# Patient Record
Sex: FEMALE | Race: ASIAN
[De-identification: names, ages, dates, MRNs, and addresses within clinical notes are randomized per-mention and may not be internally consistent; named-entity substitution may affect disease eponyms.]

---

## 2017-02-20 ENCOUNTER — HOSPITAL ENCOUNTER (EMERGENCY)
Dept: HOSPITAL 53 - M ED | Age: 27
Discharge: HOME | End: 2017-02-20
Payer: OTHER GOVERNMENT

## 2017-02-20 DIAGNOSIS — J10.1: Primary | ICD-10-CM

## 2017-02-20 DIAGNOSIS — J02.9: ICD-10-CM

## 2017-02-20 DIAGNOSIS — R11.2: ICD-10-CM

## 2017-02-20 DIAGNOSIS — Z87.891: ICD-10-CM

## 2017-02-20 NOTE — EDDOCDS
Physician Documentation                                                                           

[facility]                                                                         

Name: Aggie Paredes                                                                                

Age: 26 yrs                                                                                       

Sex: Female                                                                                       

: 1990                                                                                   

MRN: [id number]                                                                                     

Arrival Date: 2017                                                                          

Time: 13:23                                                                                       

Account#: [id number]                                                                              

Bed I1 / M1                                                                                       

Private MD:                                                                                       

Disposition:                                                                                      

17 14:30 Discharged to Home/Self Care. Impression: Influenza due to identified novel        

influenza A virus, Nausea and vomiting.                                                         

- Condition is Stable.                                                                            

- Discharge Instructions: Influenza Adult, Nausea and Vomiting, Easy-to-Read.                     

- Prescriptions for Ibuprofen 800 mg Oral Tablet - take 1 tablet by ORAL route every 8            

hours As needed take with food; 30 tablet. Tamiflu 75 mg Oral Capsule - take 1                  

capsule by ORAL route every 12 hours for 5 days; 10 capsule. Zofran 4 mg Oral Tablet            

- take 1 tablet by ORAL route 4 times per day As needed; 10 tablet.                             

- Medication Reconciliation, Local Pharmacy Hours, Work Release Form - 3 day form.                

- Follow up: Private Physician; When: 1 - 2 days; Reason: Recheck today's complaints,             

Continuance of care. Follow up: Emergency Department; Reason: Worsening of conditions.          

- Problem is new.                                                                                 

- Symptoms have improved.                                                                         

                                                                                                  

                                                                                                  

                                                                                                  

Historical:                                                                                       

- Allergies: no known allergies;                                                                  

- Home Meds:                                                                                      

1. none                                                                                         

- PMHx: none;                                                                                     

- PSHx: none;                                                                                     

- Social history: Smoking status: Patient states former smoker of tobacco. No barriers            

to communication noted, The patient speaks fluent English, Speaks appropriately for             

age.                                                                                            

- Family history: Not pertinent.                                                                  

- : The pt / caregiver states he / she is not on anticoagulants. Home medication list             

is obtained from the patient.                                                                   

- Exposure Risk Screening:: None identified.                                                      

                                                                                                  

                                                                                                  

OB/GYN:                                                                                           

                                                                                             

13:32 LMP 2017                                                                            pml 

                                                                                                  

Vital Signs:                                                                                      

13:27  / 76 RA Sitting (auto/reg); Pulse 103; Resp 20; Temp 101.6(O); Pulse Ox 99% on   jrd 

      R/A; Weight 72.85 kg / 160.61 lbs (M); Height 5 ft. 6 in. (167.64 cm); Pain 6/10;           

14:25  / 64; Pulse 93; Resp 16; Temp 100.8(O); Pulse Ox 96% ;                           lr2 

13:27 Body Mass Index 25.92 (72.85 kg, 167.64 cm)                                             jrd 

                                                                                                  

MDM:                                                                                              

13:36 Acetaminophen Tablet 650 mg PO once ordered.                                            ml6 

13:36 Obtain sample by nasopharyngeal swab ordered.                                           ml6 

13:36 Strep Screen, Nursing ordered.                                                          ml6 

13:36 -Influenza A&B Rapid Antigen - Nose Ordered.                                            EDMS

13:45 GATS (NEGATIVE STREP SCREEN) Ordered.                                                   EDMS

14:07 -Influenza A&B Rapid Antigen - Nose Reviewed.                                           sd1

14:11 Oseltamivir 75 mg PO once ordered.                                                      ef1 

14:30 Ondansetron ODT Oral Disintegrating Tablet 4 mg PO once ordered.                        ef1 

14:50 Financial registration complete.                                                        mm15

14:50 [facility] Payment Agreement was scanned into [employer] and attached to record.               mm15

                                                                                                  

Administered Medications:                                                                         

13:38 Drug: Acetaminophen 650 mg [acetaminophen 325 mg tablet (2 tabs)] Route: PO;            pml 

14:34 Drug: Oseltamivir 75 mg [oseltamivir 75 mg capsule (1 caps)] Route: PO;                 kc3 

14:48 Drug: Ondansetron ODT 4 mg [ondansetron 4 mg disintegrating tablet (1 tabs)] Route: PO; kc3 

                                                                                                  

                                                                                                  

Signatures:                                                                                       

Dispatcher [employer]                           EDMS                                                 

Gabi Bales MD MD   sd1                                                  

Marly Latif, PA-C                      PA-C ef1                                                  

Matty Fulton, FLORY                       RN   ml6                                                  

Cheryl Britt RN RN   pml                                                  

Larno Medina                             mm15                                                 

Caitlin Thorpe RN RN   kc3                                                  

                                                                                                  

The chart was reviewed and I authenticate all verbal orders and agree with the evaluation and 
treatment provided.Attachments:

14:50 [facility] Payment Agreement                                                                mm15

                                                                                                  

**************************************************************************************************

MTDD

## 2017-02-20 NOTE — EDDOCDS
Nurse's Notes                                                                                     

[facility]                                                                         

Name: Aggie Paredes                                                                                

Age: 26 yrs                                                                                       

Sex: Female                                                                                       

: 1990                                                                                   

MRN: [id number]                                                                                     

Arrival Date: 2017                                                                          

Time: 13:23                                                                                       

Account#: [id number]                                                                              

Bed I1 / M1                                                                                       

Private MD:                                                                                       

Diagnosis: Influenza due to identified novel influenza A virus;Nausea and vomiting                

                                                                                                  

Presentation:                                                                                     

                                                                                             

13:31 Presenting complaint: Patient states: sore throat and feels like she has a fever, sinus pml 

      congestion. ongoing since Friday - has not taken tylenol or ibuprofen today. Adult          

      Sepsis Screening: The patient does not have new or worsening altered mentation.             

      Patient's respiratory rate is less than 22. Systolic blood pressure is greater than         

      100. Patient has a qSOFA score of 0- Negative Sepsis Screen. Suicide/Homicide risk          

      assessment- the patient denies having any suicidal and/or homicidal ideations and does      

      not present with any other emotional, behavioral or mental health complaints.       

      Status: The patient is an active duty . The patient is a              

      dependent. Transition of care: patient was not received from another setting of care.       

13:31 Acuity: EDI Level 4                                                                     pml 

13:31 Method Of Arrival: Walkin/Carried/Asstd                                                 pml 

                                                                                                  

Triage Assessment:                                                                                

13:32 General: Appears in no apparent distress, comfortable. Pain: Location: sore throat Pain pml 

      currently is 6 out of 10 on a pain scale. HIV screening NA for this visit Offered           

      previously.                                                                                 

                                                                                                  

OB/GYN:                                                                                           

13:32 LMP 2017                                                                            pml 

                                                                                                  

Historical:                                                                                       

- Allergies: no known allergies;                                                                  

- Home Meds:                                                                                      

1. none                                                                                         

- PMHx: none;                                                                                     

- PSHx: none;                                                                                     

- Social history: Smoking status: Patient states former smoker of tobacco. No barriers            

to communication noted, The patient speaks fluent English, Speaks appropriately for             

age.                                                                                            

- Family history: Not pertinent.                                                                  

- : The pt / caregiver states he / she is not on anticoagulants. Home medication list             

is obtained from the patient.                                                                   

- Exposure Risk Screening:: None identified.                                                      

                                                                                                  

                                                                                                  

Screenin:50 Screening information is obtained from the patient. Fall risk: No risks identified.     kc3 

      Assistance ADL's: requires no assistance with activities of daily living. Abuse/DV          

      Screen: The patient / caregiver reports he/she is: not in a situation that causes fear,     

      pain or injury. Nutritional screening: No deficits noted. Advance Directives:               

      Currently, there is no health care proxy. home support is adequate.                         

                                                                                                  

Assessment:                                                                                       

14:50 General: Appears in no apparent distress, comfortable, Behavior is appropriate for age, kc3 

      cooperative. Neurological: Level of Consciousness is awake, alert, obeys commands.          

      Respiratory: Respiratory effort is even, unlabored. GI: Reports nausea. Derm: Skin is       

      pink, warm & dry.                                                                         

                                                                                                  

Vital Signs:                                                                                      

13:27  / 76 RA Sitting (auto/reg); Pulse 103; Resp 20; Temp 101.6(O); Pulse Ox 99% on   jrd 

      R/A; Weight 72.85 kg (M); Height 5 ft. 6 in. (167.64 cm); Pain 6/10;                        

14:25  / 64; Pulse 93; Resp 16; Temp 100.8(O); Pulse Ox 96% ;                           lr2 

13:27 Body Mass Index 25.92 (72.85 kg, 167.64 cm)                                             [facility] 

                                                                                                  

Vitals:                                                                                           

13:27 Log In Time: 2017 at 13:22.                                                [facility] 

                                                                                                  

ED Course:                                                                                        

13:25 Patient visited by Noah Piedra PCA.                                               jrd 

13:25 Patient moved to Waiting                                                                jrd 

13:30 Patient visited by Noah Piedra PCA.                                               jrd 

13:30 Patient moved to Pre RCE                                                                jrd 

13:32 Triage Initiated                                                                        pml 

13:34 Patient visited by Cheryl Britt RN.                                                     pml 

13:36 Patient moved to PR1 / 25                                                               ml6 

13:38 -Influenza A&B Rapid Antigen - Nose Sent.                                               ar3

13:39 Patient moved to Pre RCE                                                                ar3 

14:08 Patient moved to I1 / M1                                                                ml6 

14:09 Patient visited by Herlinda Medina PCA.                                                jlf 

14:10 [facility]                                                             ef1 

14:10 Gabi Bales MD is Attending Physician.                                      ef1 

14:10 Patient visited by Marly Latif PA-C.                                                  ef1 

14:45 Patient name changed from Rishika\S\\S\Paredes\S\ to Rishika\S\ \S\Paredes.                          
   EDMS

14:48 The patient / caregiver is instructed regarding the plan of care and ED course.         kc3 

14:49 No IV's were initiated during this patient's visit. No procedures done that require     kc3 

      assistance.                                                                                 

14:50 [facility] Payment Agreement was scanned into [employer] and attached to record.               mm15

                                                                                                  

Administered Medications:                                                                         

13:38 Drug: Acetaminophen 650 mg [acetaminophen 325 mg tablet (2 tabs)] Route: PO;            pml 

14:34 Drug: Oseltamivir 75 mg [oseltamivir 75 mg capsule (1 caps)] Route: PO;                 kc3 

14:48 Drug: Ondansetron ODT 4 mg [ondansetron 4 mg disintegrating tablet (1 tabs)] Route: PO; kc3 

                                                                                                  

                                                                                                  

Order Results:                                                                                    

Lab Order: -Influenza A&B Rapid Antigen - Nose; SPEC'M 17 13:38                           

      Test: INFLUENZA A RAPID SCR by ICA; Value: INFLUENZA A RESULTS POSITIVE; Abnormal:          

      Abnormal; Status: F                                                                         

      Test: INFLUENZA A RAPID SCR by ICA; Value: Comments:; Status: F                             

      Test: INFLUENZA B RAPID SCR by ICA; Value: INFLUENZA B RESULTS NEGATIVE; Status: F          

      Test Note: &nbsp;; The Influenza test is a direct rapid immunoassay for the qualitative   

      detection of Influenza viral antigen. Cell culture (Viral Culture) testing should be        

      considered to confirm NEGATIVE results and to assist in detecting other viruses that        

      can provide similar clinical symptoms. Please contact the lab within 24 hours               

      ([phone]) if confirmatory testing is desired.                                              

                                                                                                  

Outcome:                                                                                          

14:30 Discharge ordered by Provider.                                                          ef1 

14:51 Discharge Assessment: Patient awake, alert and oriented x 3. No cognitive and/or        kc3 

      functional deficits noted. Patient verbalized understanding of disposition                  

      instructions. patient administered narcotics - no. The following High Risk Discharge        

      criteria are identified: None. Discharged to home ambulatory. Condition: stable.            

      Discharge instructions given to patient, Instructed on discharge instructions, follow       

      up and referral plans. medication usage, Demonstrated understanding of instructions,        

      medications, Pt was receptive of discharge instructions/ teaching. Prescriptions given      

      X 2, Work note provided to patient. No special radiology studies were completed.            

      Property :Personal belongings accompany Pt.                                                 

14:53 Patient left the ED.                                                                    kc3 

                                                                                                  

Signatures:                                                                                       

Dispatcher [employer]                           EDMS                                                 

Marly Latif, PAM                      PAALEXX ef1                                                  

Matty Fulton RN                       RN   ml6                                                  

Carol Bruno, PCA                      PCA  ar3                                                  

Cheryl Britt RN RN pml McGrath, Marlynn                             mm15                                                 

Herlinda Medina, PCA                    PCA  jlf                                                  

Noah Piedra, PCA                   PCA  jrd                                                  

Caitlin Thorpe,RN                          RN   kc3                                                  

Leidy Garcia                                  lr2                                                  

                                                                                                  

**************************************************************************************************

MTDD

## 2017-02-22 NOTE — EDDOCDS
Physician Documentation                                                                           

[facility]                                                                         

Name: Aggie Paredes                                                                                

Age: 26 yrs                                                                                       

Sex: Female                                                                                       

: 1990                                                                                   

MRN: [id number]                                                                                     

Arrival Date: 2017                                                                          

Time: 13:23                                                                                       

Account#: [id number]                                                                              

Bed I1 / M1                                                                                       

Private MD:                                                                                       

Disposition:                                                                                      

17 14:30 Discharged to Home/Self Care. Impression: Influenza due to identified novel        

influenza A virus, Nausea and vomiting.                                                         

- Condition is Stable.                                                                            

- Discharge Instructions: Influenza Adult, Nausea and Vomiting, Easy-to-Read.                     

- Prescriptions for Ibuprofen 800 mg Oral Tablet - take 1 tablet by ORAL route every 8            

hours As needed take with food; 30 tablet. Tamiflu 75 mg Oral Capsule - take 1                  

capsule by ORAL route every 12 hours for 5 days; 10 capsule. Zofran 4 mg Oral Tablet            

- take 1 tablet by ORAL route 4 times per day As needed; 10 tablet.                             

- Medication Reconciliation, Local Pharmacy Hours, Work Release Form - 3 day form.                

- Follow up: Private Physician; When: 1 - 2 days; Reason: Recheck today's complaints,             

Continuance of care. Follow up: Emergency Department; Reason: Worsening of conditions.          

- Problem is new.                                                                                 

- Symptoms have improved.                                                                         

                                                                                                  

                                                                                                  

                                                                                                  

Historical:                                                                                       

- Allergies: no known allergies;                                                                  

- Home Meds:                                                                                      

1. none                                                                                         

- PMHx: none;                                                                                     

- PSHx: none;                                                                                     

- Social history: Smoking status: Patient states former smoker of tobacco. No barriers            

to communication noted, The patient speaks fluent English, Speaks appropriately for             

age.                                                                                            

- Family history: Not pertinent.                                                                  

- : The pt / caregiver states he / she is not on anticoagulants. Home medication list             

is obtained from the patient.                                                                   

- Exposure Risk Screening:: None identified.                                                      

                                                                                                  

                                                                                                  

OB/GYN:                                                                                           

                                                                                             

13:32 LMP 2017                                                                            pml 

                                                                                                  

Vital Signs:                                                                                      

13:27  / 76 RA Sitting (auto/reg); Pulse 103; Resp 20; Temp 101.6(O); Pulse Ox 99% on   jrd 

      R/A; Weight 72.85 kg / 160.61 lbs (M); Height 5 ft. 6 in. (167.64 cm); Pain 6/10;           

14:25  / 64; Pulse 93; Resp 16; Temp 100.8(O); Pulse Ox 96% ;                           lr2 

13:27 Body Mass Index 25.92 (72.85 kg, 167.64 cm)                                             jrd 

                                                                                                  

MDM:                                                                                              

13:36 Acetaminophen Tablet 650 mg PO once ordered.                                            ml6 

13:36 Obtain sample by nasopharyngeal swab ordered.                                           ml6 

13:36 Strep Screen, Nursing ordered.                                                          ml6 

13:36 -Influenza A&B Rapid Antigen - Nose Ordered.                                            EDMS

13:45 GATS (NEGATIVE STREP SCREEN) Ordered.                                                   EDMS

14:07 -Influenza A&B Rapid Antigen - Nose Reviewed.                                           sd1

14:11 Oseltamivir 75 mg PO once ordered.                                                      ef1 

14:30 Ondansetron ODT Oral Disintegrating Tablet 4 mg PO once ordered.                        ef1 

14:50 Financial registration complete.                                                        mm15

14:50 [facility] Payment Agreement was scanned into [employer] and attached to record.               15

                                                                                             

10:01 T-Sheet-- Draft Copy was scanned into [employer] and attached to record.                   gb  

                                                                                                  

Administered Medications:                                                                         

                                                                                             

13:38 Drug: Acetaminophen 650 mg [acetaminophen 325 mg tablet (2 tabs)] Route: PO;            pml 

14:34 Drug: Oseltamivir 75 mg [oseltamivir 75 mg capsule (1 caps)] Route: PO;                 kc3 

14:48 Drug: Ondansetron ODT 4 mg [ondansetron 4 mg disintegrating tablet (1 tabs)] Route: PO; kc3 

                                                                                                  

                                                                                                  

Signatures:                                                                                       

Dispatcher [employer]                           EDGabi Brown MD MD   sd1                                                  

Layla Mcgee, Reg                  Reg  gb                                                   

Marly Latif PA-C                      PAALEXX ef1                                                  

Matty Fulton RN RN   ml6                                                  

Cheryl Britt RN RN   pml                                                  

Laron Medina                             mm15                                                 

Caitlin Thorpe RN RN   kc3                                                  

                                                                                                  

The chart was reviewed and I authenticate all verbal orders and agree with the evaluation and 
treatment provided.Attachments:

14:50 [facility] Payment Agreement                                                                15

                                                                                             

10:01 T-Sheet-- Draft Copy                                                                    gb  

                                                                                                  

**************************************************************************************************



*** Chart Complete ***
MTDD

## 2017-02-22 NOTE — EDDOCDS
Physician Documentation                                                                           

[facility]                                                                         

Name: Aggie Paredes                                                                                

Age: 26 yrs                                                                                       

Sex: Female                                                                                       

: 1990                                                                                   

MRN: [id number]                                                                                     

Arrival Date: 2017                                                                          

Time: 13:23                                                                                       

Account#: [id number]                                                                              

Bed I1 / M1                                                                                       

Private MD:                                                                                       

Disposition:                                                                                      

17 14:30 Discharged to Home/Self Care. Impression: Influenza due to identified novel        

influenza A virus, Nausea and vomiting.                                                         

- Condition is Stable.                                                                            

- Discharge Instructions: Influenza Adult, Nausea and Vomiting, Easy-to-Read.                     

- Prescriptions for Ibuprofen 800 mg Oral Tablet - take 1 tablet by ORAL route every 8            

hours As needed take with food; 30 tablet. Tamiflu 75 mg Oral Capsule - take 1                  

capsule by ORAL route every 12 hours for 5 days; 10 capsule. Zofran 4 mg Oral Tablet            

- take 1 tablet by ORAL route 4 times per day As needed; 10 tablet.                             

- Medication Reconciliation, Local Pharmacy Hours, Work Release Form - 3 day form.                

- Follow up: Private Physician; When: 1 - 2 days; Reason: Recheck today's complaints,             

Continuance of care. Follow up: Emergency Department; Reason: Worsening of conditions.          

- Problem is new.                                                                                 

- Symptoms have improved.                                                                         

                                                                                                  

                                                                                                  

                                                                                                  

Historical:                                                                                       

- Allergies: no known allergies;                                                                  

- Home Meds:                                                                                      

1. none                                                                                         

- PMHx: none;                                                                                     

- PSHx: none;                                                                                     

- Social history: Smoking status: Patient states former smoker of tobacco. No barriers            

to communication noted, The patient speaks fluent English, Speaks appropriately for             

age.                                                                                            

- Family history: Not pertinent.                                                                  

- : The pt / caregiver states he / she is not on anticoagulants. Home medication list             

is obtained from the patient.                                                                   

- Exposure Risk Screening:: None identified.                                                      

                                                                                                  

                                                                                                  

OB/GYN:                                                                                           

                                                                                             

13:32 LMP 2017                                                                            pml 

                                                                                                  

Vital Signs:                                                                                      

13:27  / 76 RA Sitting (auto/reg); Pulse 103; Resp 20; Temp 101.6(O); Pulse Ox 99% on   jrd 

      R/A; Weight 72.85 kg / 160.61 lbs (M); Height 5 ft. 6 in. (167.64 cm); Pain 6/10;           

14:25  / 64; Pulse 93; Resp 16; Temp 100.8(O); Pulse Ox 96% ;                           lr2 

13:27 Body Mass Index 25.92 (72.85 kg, 167.64 cm)                                             jrd 

                                                                                                  

MDM:                                                                                              

13:36 Acetaminophen Tablet 650 mg PO once ordered.                                            ml6 

13:36 Obtain sample by nasopharyngeal swab ordered.                                           ml6 

13:36 Strep Screen, Nursing ordered.                                                          ml6 

13:36 -Influenza A&B Rapid Antigen - Nose Ordered.                                            EDMS

13:45 GATS (NEGATIVE STREP SCREEN) Ordered.                                                   EDMS

14:07 -Influenza A&B Rapid Antigen - Nose Reviewed.                                           sd1

14:11 Oseltamivir 75 mg PO once ordered.                                                      ef1 

14:30 Ondansetron ODT Oral Disintegrating Tablet 4 mg PO once ordered.                        ef1 

14:50 Financial registration complete.                                                        mm15

14:50 [facility] Payment Agreement was scanned into [employer] and attached to record.               15

                                                                                             

10:01 T-Sheet-- Draft Copy was scanned into [employer] and attached to record.                   gb  

                                                                                                  

Administered Medications:                                                                         

                                                                                             

13:38 Drug: Acetaminophen 650 mg [acetaminophen 325 mg tablet (2 tabs)] Route: PO;            pml 

14:34 Drug: Oseltamivir 75 mg [oseltamivir 75 mg capsule (1 caps)] Route: PO;                 kc3 

14:48 Drug: Ondansetron ODT 4 mg [ondansetron 4 mg disintegrating tablet (1 tabs)] Route: PO; kc3 

                                                                                                  

                                                                                                  

Signatures:                                                                                       

Dispatcher [employer]                           EDGabi Brown MD MD   sd1                                                  

Layla Mcgee, Reg                  Reg  gb                                                   

Marly Latif PA-C                      PAALEXX ef1                                                  

Matty Fulton RN RN   ml6                                                  

Cheryl Britt RN RN   pml                                                  

Laron Medina                             mm15                                                 

Caitlin Thorpe RN RN   kc3                                                  

                                                                                                  

The chart was reviewed and I authenticate all verbal orders and agree with the evaluation and 
treatment provided.Attachments:

14:50 [facility] Payment Agreement                                                                15

                                                                                             

10:01 T-Sheet-- Draft Copy                                                                    gb  

                                                                                                  

**************************************************************************************************



*** Chart Complete ***
MTDD

## 2017-02-22 NOTE — EDDOCDS
Nurse's Notes                                                                                     

[facility]                                                                         

Name: Aggie Paredes                                                                                

Age: 26 yrs                                                                                       

Sex: Female                                                                                       

: 1990                                                                                   

MRN: [id number]                                                                                     

Arrival Date: 2017                                                                          

Time: 13:23                                                                                       

Account#: [id number]                                                                              

Bed I1 / M1                                                                                       

Private MD:                                                                                       

Diagnosis: Influenza due to identified novel influenza A virus;Nausea and vomiting                

                                                                                                  

Presentation:                                                                                     

                                                                                             

13:31 Presenting complaint: Patient states: sore throat and feels like she has a fever, sinus pml 

      congestion. ongoing since Friday - has not taken tylenol or ibuprofen today. Adult          

      Sepsis Screening: The patient does not have new or worsening altered mentation.             

      Patient's respiratory rate is less than 22. Systolic blood pressure is greater than         

      100. Patient has a qSOFA score of 0- Negative Sepsis Screen. Suicide/Homicide risk          

      assessment- the patient denies having any suicidal and/or homicidal ideations and does      

      not present with any other emotional, behavioral or mental health complaints.       

      Status: The patient is an active duty . The patient is a              

      dependent. Transition of care: patient was not received from another setting of care.       

13:31 Acuity: EDI Level 4                                                                     pml 

13:31 Method Of Arrival: Walkin/Carried/Asstd                                                 pml 

                                                                                                  

Triage Assessment:                                                                                

13:32 General: Appears in no apparent distress, comfortable. Pain: Location: sore throat Pain pml 

      currently is 6 out of 10 on a pain scale. HIV screening NA for this visit Offered           

      previously.                                                                                 

                                                                                                  

OB/GYN:                                                                                           

13:32 LMP 2017                                                                            pml 

                                                                                                  

Historical:                                                                                       

- Allergies: no known allergies;                                                                  

- Home Meds:                                                                                      

1. none                                                                                         

- PMHx: none;                                                                                     

- PSHx: none;                                                                                     

- Social history: Smoking status: Patient states former smoker of tobacco. No barriers            

to communication noted, The patient speaks fluent English, Speaks appropriately for             

age.                                                                                            

- Family history: Not pertinent.                                                                  

- : The pt / caregiver states he / she is not on anticoagulants. Home medication list             

is obtained from the patient.                                                                   

- Exposure Risk Screening:: None identified.                                                      

                                                                                                  

                                                                                                  

Screenin:50 Screening information is obtained from the patient. Fall risk: No risks identified.     kc3 

      Assistance ADL's: requires no assistance with activities of daily living. Abuse/DV          

      Screen: The patient / caregiver reports he/she is: not in a situation that causes fear,     

      pain or injury. Nutritional screening: No deficits noted. Advance Directives:               

      Currently, there is no health care proxy. home support is adequate.                         

                                                                                                  

Assessment:                                                                                       

14:50 General: Appears in no apparent distress, comfortable, Behavior is appropriate for age, kc3 

      cooperative. Neurological: Level of Consciousness is awake, alert, obeys commands.          

      Respiratory: Respiratory effort is even, unlabored. GI: Reports nausea. Derm: Skin is       

      pink, warm & dry.                                                                         

                                                                                                  

Vital Signs:                                                                                      

13:27  / 76 RA Sitting (auto/reg); Pulse 103; Resp 20; Temp 101.6(O); Pulse Ox 99% on   jrd 

      R/A; Weight 72.85 kg (M); Height 5 ft. 6 in. (167.64 cm); Pain 6/10;                        

14:25  / 64; Pulse 93; Resp 16; Temp 100.8(O); Pulse Ox 96% ;                           lr2 

13:27 Body Mass Index 25.92 (72.85 kg, 167.64 cm)                                             [facility] 

                                                                                                  

Vitals:                                                                                           

13:27 Log In Time: 2017 at 13:22.                                                [facility] 

                                                                                                  

ED Course:                                                                                        

13:25 Patient visited by Noah Piedra PCA.                                               jrd 

13:25 Patient moved to Waiting                                                                jrd 

13:30 Patient visited by Noah Piedra PCA.                                               jrd 

13:30 Patient moved to Pre RCE                                                                jrd 

13:32 Triage Initiated                                                                        pml 

13:34 Patient visited by Cheryl Britt RN.                                                     pml 

13:36 Patient moved to PR1 / 25                                                               ml6 

13:38 -Influenza A&B Rapid Antigen - Nose Sent.                                               ar3

13:39 Patient moved to Pre RCE                                                                ar3 

14:08 Patient moved to I1 / M1                                                                ml6 

14:09 Patient visited by Herlinda Medina PCA.                                                jlf 

14:10 [facility]if [facility]                                                             ef1 

14:10 Gabi Bales MD is Attending Physician.                                      ef1 

14:10 Patient visited by Marly Latif PA-C.                                                  ef1 

14:45 Patient name changed from Rishika\S\\S\Paredes\S\ to Rishika\S\ \S\Paredes.                          
   EDMS

14:48 The patient / caregiver is instructed regarding the plan of care and ED course.         kc3 

14:49 No IV's were initiated during this patient's visit. No procedures done that require     kc3 

      assistance.                                                                                 

14:50 [facility] Payment Agreement was scanned into [employer] and attached to record.               mm15

                                                                                             

10:01 T-Sheet-- Draft Copy was scanned into [employer] and attached to record.                   gb  

                                                                                                  

Administered Medications:                                                                         

                                                                                             

13:38 Drug: Acetaminophen 650 mg [acetaminophen 325 mg tablet (2 tabs)] Route: PO;            pml 

14:34 Drug: Oseltamivir 75 mg [oseltamivir 75 mg capsule (1 caps)] Route: PO;                 kc3 

14:48 Drug: Ondansetron ODT 4 mg [ondansetron 4 mg disintegrating tablet (1 tabs)] Route: PO; kc3 

                                                                                                  

                                                                                                  

Order Results:                                                                                    

Lab Order: -Influenza A&B Rapid Antigen - Nose; SPEC'M 17 13:38                           

      Test: INFLUENZA A RAPID SCR by ICA; Value: INFLUENZA A RESULTS POSITIVE; Abnormal:          

      Abnormal; Status: F                                                                         

      Test: INFLUENZA A RAPID SCR by ICA; Value: Comments:; Status: F                             

      Test: INFLUENZA B RAPID SCR by ICA; Value: INFLUENZA B RESULTS NEGATIVE; Status: F          

      Test Note: &nbsp;; The Influenza test is a direct rapid immunoassay for the qualitative   

      detection of Influenza viral antigen. Cell culture (Viral Culture) testing should be        

      considered to confirm NEGATIVE results and to assist in detecting other viruses that        

      can provide similar clinical symptoms. Please contact the lab within 24 hours               

      ([phone]) if confirmatory testing is desired.                                              

Lab Order: GATS (NEGATIVE STREP SCREEN); SPEC'M 17 11:46                                    

      Test: GATS CULTURE (NEG STREP SCR); Value: GATS RESULT NEGATIVE FOR STREP PYOGENES          

      (GROUP A); Status: F                                                                        

      Test: GATS CULTURE (NEG STREP SCR); Value: <EXTERNAL COMMENT eCWMed> FULL REPORT IN LAB     

      NOTES (eCW and [employer]).; Status: F                                                          

                                                                                                  

Outcome:                                                                                          

14:30 Discharge ordered by Provider.                                                          ef1 

14:51 Discharge Assessment: Patient awake, alert and oriented x 3. No cognitive and/or        kc3 

      functional deficits noted. Patient verbalized understanding of disposition                  

      instructions. patient administered narcotics - no. The following High Risk Discharge        

      criteria are identified: None. Discharged to home ambulatory. Condition: stable.            

      Discharge instructions given to patient, Instructed on discharge instructions, follow       

      up and referral plans. medication usage, Demonstrated understanding of instructions,        

      medications, Pt was receptive of discharge instructions/ teaching. Prescriptions given      

      X 2, Work note provided to patient. No special radiology studies were completed.            

      Property :Personal belongings accompany Pt.                                                 

14:53 Patient left the ED.                                                                    [email or web address] 

                                                                                                  

Signatures:                                                                                       

Dispatcher [employer]                           EDMS                                                 

Layla Mcgee, Reg                  Reg  gb                                                   

SalomonMikeMarly, PA-C                      PAAnnieC ef1                                                  

Matty Fulton, RN                       RN   ml6                                                  

Carol Bruno, PCA                      PCA  ar3                                                  

Cheryl Britt,RN                         RN   Laron Rosales                             mm15                                                 

Herlinda Medina, PCA                    PCA  jlf                                                  

Noah Piedra, PCA                   PCA  Caitlin Stroud,FLORY                          RN   micha3                                                  

Leidy Garcia2                                                  

                                                                                                  

**************************************************************************************************



*** Chart Complete ***
MTDD

## 2018-07-18 ENCOUNTER — HOSPITAL ENCOUNTER (OUTPATIENT)
Dept: HOSPITAL 53 - M LAB | Age: 28
End: 2018-07-18
Attending: PHYSICIAN ASSISTANT
Payer: COMMERCIAL

## 2018-07-18 DIAGNOSIS — Z01.83: Primary | ICD-10-CM

## 2018-07-18 PROCEDURE — 86901 BLOOD TYPING SEROLOGIC RH(D): CPT

## 2018-10-10 ENCOUNTER — HOSPITAL ENCOUNTER (OUTPATIENT)
Dept: HOSPITAL 53 - M RAD | Age: 28
End: 2018-10-10
Attending: GENERAL PRACTICE
Payer: COMMERCIAL

## 2018-10-10 DIAGNOSIS — N60.32: Primary | ICD-10-CM

## 2018-10-10 PROCEDURE — 77065 DX MAMMO INCL CAD UNI: CPT
